# Patient Record
Sex: FEMALE | Race: WHITE | NOT HISPANIC OR LATINO | Employment: FULL TIME | URBAN - METROPOLITAN AREA
[De-identification: names, ages, dates, MRNs, and addresses within clinical notes are randomized per-mention and may not be internally consistent; named-entity substitution may affect disease eponyms.]

---

## 2024-06-15 ENCOUNTER — APPOINTMENT (OUTPATIENT)
Dept: GENERAL RADIOLOGY | Facility: HOSPITAL | Age: 69
End: 2024-06-15
Payer: MEDICARE

## 2024-06-15 ENCOUNTER — HOSPITAL ENCOUNTER (EMERGENCY)
Facility: HOSPITAL | Age: 69
Discharge: HOME | End: 2024-06-15
Attending: STUDENT IN AN ORGANIZED HEALTH CARE EDUCATION/TRAINING PROGRAM
Payer: MEDICARE

## 2024-06-15 VITALS
OXYGEN SATURATION: 96 % | HEIGHT: 61 IN | SYSTOLIC BLOOD PRESSURE: 197 MMHG | TEMPERATURE: 97.9 F | HEART RATE: 88 BPM | RESPIRATION RATE: 20 BRPM | DIASTOLIC BLOOD PRESSURE: 86 MMHG | WEIGHT: 293 LBS | BODY MASS INDEX: 55.32 KG/M2

## 2024-06-15 DIAGNOSIS — S93.601A SPRAIN OF RIGHT FOOT, INITIAL ENCOUNTER: Primary | ICD-10-CM

## 2024-06-15 PROCEDURE — 73610 X-RAY EXAM OF ANKLE: CPT

## 2024-06-15 PROCEDURE — 73630 X-RAY EXAM OF FOOT: CPT

## 2024-06-15 PROCEDURE — 99283 EMERGENCY DEPT VISIT LOW MDM: CPT

## 2024-06-15 NOTE — ED PROVIDER NOTES
"Subjective  History of Present Illness:    Chief Complaint: Right foot pain, twisted her ankle  History of Present Illness: 68-year-old female who states she missed stepped off a curb last night twisting her right foot, she has pain on the top of the right foot below the small toe.  Onset: Sudden  Duration: Last night  Exacerbating / Alleviating factors: Pain with movement and bearing weight  Associated symptoms: No other associated symptoms or injuries      Nurses Notes reviewed and agree, including vitals, allergies, social history and prior medical history.     REVIEW OF SYSTEMS: All systems reviewed and not pertinent unless noted.    Review of Systems   Musculoskeletal:         Right foot injury   All other systems reviewed and are negative.      History reviewed. No pertinent past medical history.    Allergies:    Patient has no known allergies.      History reviewed. No pertinent surgical history.      Social History     Socioeconomic History    Marital status: Single         History reviewed. No pertinent family history.    Objective  Physical Exam:  BP (!) 197/86 (BP Location: Right arm, Patient Position: Sitting)   Pulse 88   Temp 97.9 °F (36.6 °C) (Oral)   Resp 20   Ht 154.9 cm (61\")   Wt 133 kg (294 lb)   SpO2 96%   BMI 55.55 kg/m²      Physical Exam  Vitals and nursing note reviewed.   Constitutional:       Appearance: She is well-developed.   HENT:      Head: Normocephalic and atraumatic.   Pulmonary:      Effort: Pulmonary effort is normal.   Musculoskeletal:         General: Normal range of motion.      Cervical back: Normal range of motion and neck supple.        Feet:       Comments: Tender to palpation right fifth metatarsal   Skin:     General: Skin is warm and dry.   Neurological:      Mental Status: She is alert and oriented to person, place, and time.      Deep Tendon Reflexes: Reflexes are normal and symmetric.           Procedures    ED Course:         Lab Results (last 24 hours)       " ** No results found for the last 24 hours. **             XR Foot 3+ View Right    Result Date: 6/15/2024  XR ANKLE 3+ VW RIGHT, XR FOOT 3+ VW RIGHT Date of Exam: 6/15/2024 11:08 AM EDT Indication: twisted Comparison: None available. Findings: Ankle: No evidence of fracture or dislocation. No bony or joint abnormality Foot: No evidence of fracture or dislocation. Dorsal spurring of the head of the talus which may be related to remote trauma. No acute bony or joint abnormality     Impression: Impression: No evidence of acute fracture of the right ankle and foot Electronically Signed: Gus Limon  6/15/2024 11:22 AM EDT  Workstation ID: [id number]    XR Ankle 3+ View Right    Result Date: 6/15/2024  XR ANKLE 3+ VW RIGHT, XR FOOT 3+ VW RIGHT Date of Exam: 6/15/2024 11:08 AM EDT Indication: twisted Comparison: None available. Findings: Ankle: No evidence of fracture or dislocation. No bony or joint abnormality Foot: No evidence of fracture or dislocation. Dorsal spurring of the head of the talus which may be related to remote trauma. No acute bony or joint abnormality     Impression: Impression: No evidence of acute fracture of the right ankle and foot Electronically Signed: Gus Braxton  6/15/2024 11:22 AM EDT  Workstation ID: [id number]        Medical Decision Making  Problems Addressed:  Sprain of right foot, initial encounter: complicated acute illness or injury    Amount and/or Complexity of Data Reviewed  External Data Reviewed: notes.  Radiology: ordered and independent interpretation performed. Decision-making details documented in ED Course.          Final diagnoses:   Sprain of right foot, initial encounter           Disposition discharged       Paul Saleh Jr., PABEN  06/15/24 3274

## 2025-04-16 ENCOUNTER — HOSPITAL ENCOUNTER (EMERGENCY)
Facility: HOSPITAL | Age: 70
Discharge: HOME | End: 2025-04-16
Attending: EMERGENCY MEDICINE | Admitting: EMERGENCY MEDICINE
Payer: MEDICARE

## 2025-04-16 ENCOUNTER — APPOINTMENT (OUTPATIENT)
Dept: GENERAL RADIOLOGY | Facility: HOSPITAL | Age: 70
End: 2025-04-16
Payer: MEDICARE

## 2025-04-16 VITALS
RESPIRATION RATE: 18 BRPM | TEMPERATURE: 97.8 F | WEIGHT: 292 LBS | OXYGEN SATURATION: 96 % | HEIGHT: 61 IN | DIASTOLIC BLOOD PRESSURE: 78 MMHG | SYSTOLIC BLOOD PRESSURE: 116 MMHG | BODY MASS INDEX: 55.13 KG/M2 | HEART RATE: 68 BPM

## 2025-04-16 DIAGNOSIS — M79.602 PAIN OF LEFT UPPER EXTREMITY: Primary | ICD-10-CM

## 2025-04-16 DIAGNOSIS — M54.2 ACUTE NECK PAIN: ICD-10-CM

## 2025-04-16 LAB
ALBUMIN SERPL-MCNC: 3.7 G/DL (ref 3.5–5.2)
ALBUMIN/GLOB SERPL: 1.2 G/DL
ALP SERPL-CCNC: 68 U/L (ref 39–117)
ALT SERPL W P-5'-P-CCNC: 12 U/L (ref 1–33)
ANION GAP SERPL CALCULATED.3IONS-SCNC: 10 MMOL/L (ref 5–15)
AST SERPL-CCNC: 21 U/L (ref 1–32)
BASOPHILS # BLD AUTO: 0.07 10*3/MM3 (ref 0–0.2)
BASOPHILS NFR BLD AUTO: 0.9 % (ref 0–1.5)
BILIRUB SERPL-MCNC: 0.4 MG/DL (ref 0–1.2)
BUN SERPL-MCNC: 23 MG/DL (ref 8–23)
BUN/CREAT SERPL: 16.8 (ref 7–25)
CALCIUM SPEC-SCNC: 9.7 MG/DL (ref 8.6–10.5)
CHLORIDE SERPL-SCNC: 103 MMOL/L (ref 98–107)
CO2 SERPL-SCNC: 26 MMOL/L (ref 22–29)
CREAT SERPL-MCNC: 1.37 MG/DL (ref 0.57–1)
D DIMER PPP FEU-MCNC: 0.62 MCGFEU/ML (ref 0–0.69)
DEPRECATED RDW RBC AUTO: 37.7 FL (ref 37–54)
EGFRCR SERPLBLD CKD-EPI 2021: 41.9 ML/MIN/1.73
EOSINOPHIL # BLD AUTO: 0.24 10*3/MM3 (ref 0–0.4)
EOSINOPHIL NFR BLD AUTO: 3 % (ref 0.3–6.2)
ERYTHROCYTE [DISTWIDTH] IN BLOOD BY AUTOMATED COUNT: 12.5 % (ref 12.3–15.4)
GEN 5 1HR TROPONIN T REFLEX: 8 NG/L
GLOBULIN UR ELPH-MCNC: 3.1 GM/DL
GLUCOSE SERPL-MCNC: 102 MG/DL (ref 65–99)
HCT VFR BLD AUTO: 41 % (ref 34–46.6)
HGB BLD-MCNC: 13.9 G/DL (ref 12–15.9)
HOLD SPECIMEN: NORMAL
IMM GRANULOCYTES # BLD AUTO: 0.02 10*3/MM3 (ref 0–0.05)
IMM GRANULOCYTES NFR BLD AUTO: 0.2 % (ref 0–0.5)
LIPASE SERPL-CCNC: 32 U/L (ref 13–60)
LYMPHOCYTES # BLD AUTO: 2.32 10*3/MM3 (ref 0.7–3.1)
LYMPHOCYTES NFR BLD AUTO: 28.6 % (ref 19.6–45.3)
MCH RBC QN AUTO: 28.3 PG (ref 26.6–33)
MCHC RBC AUTO-ENTMCNC: 33.9 G/DL (ref 31.5–35.7)
MCV RBC AUTO: 83.3 FL (ref 79–97)
MONOCYTES # BLD AUTO: 0.78 10*3/MM3 (ref 0.1–0.9)
MONOCYTES NFR BLD AUTO: 9.6 % (ref 5–12)
NEUTROPHILS NFR BLD AUTO: 4.67 10*3/MM3 (ref 1.7–7)
NEUTROPHILS NFR BLD AUTO: 57.7 % (ref 42.7–76)
NRBC BLD AUTO-RTO: 0 /100 WBC (ref 0–0.2)
NT-PROBNP SERPL-MCNC: 186.9 PG/ML (ref 0–900)
PLATELET # BLD AUTO: 345 10*3/MM3 (ref 140–450)
PMV BLD AUTO: 9.6 FL (ref 6–12)
POTASSIUM SERPL-SCNC: 4.6 MMOL/L (ref 3.5–5.2)
PROT SERPL-MCNC: 6.8 G/DL (ref 6–8.5)
RBC # BLD AUTO: 4.92 10*6/MM3 (ref 3.77–5.28)
SODIUM SERPL-SCNC: 139 MMOL/L (ref 136–145)
TROPONIN T NUMERIC DELTA: -1 NG/L
TROPONIN T SERPL HS-MCNC: 9 NG/L
WBC NRBC COR # BLD AUTO: 8.1 10*3/MM3 (ref 3.4–10.8)
WHOLE BLOOD HOLD COAG: NORMAL
WHOLE BLOOD HOLD SPECIMEN: NORMAL

## 2025-04-16 PROCEDURE — 83690 ASSAY OF LIPASE: CPT | Performed by: EMERGENCY MEDICINE

## 2025-04-16 PROCEDURE — 36415 COLL VENOUS BLD VENIPUNCTURE: CPT

## 2025-04-16 PROCEDURE — 80053 COMPREHEN METABOLIC PANEL: CPT | Performed by: EMERGENCY MEDICINE

## 2025-04-16 PROCEDURE — 93005 ELECTROCARDIOGRAM TRACING: CPT | Performed by: EMERGENCY MEDICINE

## 2025-04-16 PROCEDURE — 84484 ASSAY OF TROPONIN QUANT: CPT | Performed by: EMERGENCY MEDICINE

## 2025-04-16 PROCEDURE — 83880 ASSAY OF NATRIURETIC PEPTIDE: CPT | Performed by: EMERGENCY MEDICINE

## 2025-04-16 PROCEDURE — 99284 EMERGENCY DEPT VISIT MOD MDM: CPT

## 2025-04-16 PROCEDURE — 85025 COMPLETE CBC W/AUTO DIFF WBC: CPT | Performed by: EMERGENCY MEDICINE

## 2025-04-16 PROCEDURE — 71045 X-RAY EXAM CHEST 1 VIEW: CPT

## 2025-04-16 PROCEDURE — 73060 X-RAY EXAM OF HUMERUS: CPT

## 2025-04-16 PROCEDURE — 85379 FIBRIN DEGRADATION QUANT: CPT | Performed by: NURSE PRACTITIONER

## 2025-04-16 RX ORDER — ASPIRIN 81 MG/1
324 TABLET, CHEWABLE ORAL ONCE
Status: DISCONTINUED | OUTPATIENT
Start: 2025-04-16 | End: 2025-04-16 | Stop reason: HOSPADM

## 2025-04-16 RX ORDER — SODIUM CHLORIDE 0.9 % (FLUSH) 0.9 %
10 SYRINGE (ML) INJECTION AS NEEDED
Status: DISCONTINUED | OUTPATIENT
Start: 2025-04-16 | End: 2025-04-16 | Stop reason: HOSPADM

## 2025-04-16 NOTE — ED PROVIDER NOTES
Subjective   History of Present Illness  Pleasant patient presents to the ER for left arm pain.  No trauma that she can remember but she does swim every day.  It is reproducible with movement of the left arm.  Concerned about a heart attack or some sort of clot in that area.  She denies any history of any heart attack or stents.  She denies any history of DVTs.  She tells me she swims every day.  She does report some cardiac testing but not a heart cath several years ago that was reportedly okay.  She denies any difficulty breathing.        Review of Systems    No past medical history on file.    No Known Allergies    No past surgical history on file.    No family history on file.    Social History     Socioeconomic History    Marital status: Single           Objective   Physical Exam  Constitutional:       Appearance: She is well-developed. She is obese.   HENT:      Head: Normocephalic and atraumatic.      Right Ear: External ear normal.      Left Ear: External ear normal.      Nose: Nose normal.   Eyes:      Conjunctiva/sclera: Conjunctivae normal.      Pupils: Pupils are equal, round, and reactive to light.   Cardiovascular:      Rate and Rhythm: Normal rate and regular rhythm.      Heart sounds: Normal heart sounds.   Pulmonary:      Effort: Pulmonary effort is normal.      Breath sounds: Normal breath sounds.   Abdominal:      General: Bowel sounds are normal.      Palpations: Abdomen is soft.   Musculoskeletal:         General: Normal range of motion.      Cervical back: Normal range of motion and neck supple.      Comments: Movement of her left arm she does have some pain in the lateral shoulder.  Good pulses good sensation good movement.  Good strength.   Skin:     General: Skin is warm and dry.   Neurological:      Mental Status: She is alert and oriented to person, place, and time.   Psychiatric:         Behavior: Behavior normal.         Thought Content: Thought content normal.         Judgment: Judgment  normal.         Procedures           ED Course  ED Course as of 04/17/25 2241   Wed Apr 16, 2025   1048 EKG interpreted by myself no acute process [JM]   1116 Diagnosis includes coronary artery disease.  Pulmonary embolism.  DVT rotator cuff injury.  It is reproducible when she moves her left arm and she swims every day. [JM]   1204 No chest pain or difficulty breathing.  She is not hypoxic or tachycardic.  I have ordered a D-dimer.  That is positive we may end up doing a CTA or an ultrasound of her arm as well. [JM]   1246 Negative D-dimer.  Patient is not tachycardic or hypoxic.  First troponin is negative we will get a second set. [JM]   1306 Left humerus x-ray interpreted by myself no acute process.  Chest x-ray interpreted by myself no acute process [JM]   1315 Second EKG interpreted by myself with no acute process [JM]   1431 I had a great conversation with the patient.  She has had 2 negative troponins 2 negative EKGs.  First neck and EKG are reassuring.  Negative D-dimer.  She is asymptomatic.  I will give her a follow-up with the cardiology clinic.  Did give her strict warning signs symptoms worsen condition.  She is thankful for care given the [facility] ER.  Advised her of the strict warning signs symptoms worsen condition as well as appropriate follow-up. [JM]   1434 Pt thankful and agreeable with tx poc.  We discussed the signs and symptoms of worsening condition as well as what should bring them back to the emergency department along with appropriate follow-up.  Well aware of the ss of worsening condition.     If advanced imaging was ordered, please see the radiology interpretation of the CT scan MRI or ultrasound for the official reading.   [JM]      ED Course User Index  [JM] Danie Tinoco, APRN                                           XR Humerus Left   Final Result   No acute cardiopulmonary abnormality.      Humerus: No fractures or dislocations. No aggressive osseous lesions. Mild arthritis  acromioclavicular joint.   Impression:   Normal humerus radiograph exam.         Electronically Signed: Laurel Romero MD     4/16/2025 11:28 AM EDT     Workstation ID: [id number]      XR Chest 1 View   Final Result   No acute cardiopulmonary abnormality.      Humerus: No fractures or dislocations. No aggressive osseous lesions. Mild arthritis acromioclavicular joint.   Impression:   Normal humerus radiograph exam.         Electronically Signed: Laurel Romero MD     4/16/2025 11:28 AM EDT     Workstation ID: [id number]                    Medical Decision Making  Problems Addressed:  Acute neck pain: complicated acute illness or injury  Pain of left upper extremity: complicated acute illness or injury    Amount and/or Complexity of Data Reviewed  External Data Reviewed: labs, radiology and ECG.  Labs: ordered.  Radiology: ordered.  ECG/medicine tests: ordered.    Risk  OTC drugs.  Prescription drug management.        Final diagnoses:   Pain of left upper extremity   Acute neck pain       ED Disposition  ED Disposition       ED Disposition   Discharge    Condition   Stable    Comment   --               [facility] CARDIOLOGY  [address]  [address]  [facility] [address] [phone]  Schedule an appointment as soon as possible for a visit       Dee Serra, PA  [address] [place] [address] [address]  [facility] [address] [phone]      If symptoms worsen    [facility] EMERGENCY DEPARTMENT  [address] [address] [phone]    If symptoms worsen         Medication List      No changes were made to your prescriptions during this visit.            Danie Tinoco, APRN  04/17/25 2245

## 2025-04-17 LAB
QT INTERVAL: 406 MS
QT INTERVAL: 422 MS
QTC INTERVAL: 417 MS
QTC INTERVAL: 429 MS